# Patient Record
Sex: FEMALE
[De-identification: names, ages, dates, MRNs, and addresses within clinical notes are randomized per-mention and may not be internally consistent; named-entity substitution may affect disease eponyms.]

---

## 2018-01-08 ENCOUNTER — RESULT REVIEW (OUTPATIENT)
Age: 28
End: 2018-01-08

## 2018-12-15 PROBLEM — Z00.00 ENCOUNTER FOR PREVENTIVE HEALTH EXAMINATION: Status: ACTIVE | Noted: 2018-12-15

## 2018-12-24 ENCOUNTER — RECORD ABSTRACTING (OUTPATIENT)
Age: 28
End: 2018-12-24

## 2018-12-24 DIAGNOSIS — Z87.42 PERSONAL HISTORY OF OTHER DISEASES OF THE FEMALE GENITAL TRACT: ICD-10-CM

## 2018-12-24 DIAGNOSIS — Z92.89 PERSONAL HISTORY OF OTHER MEDICAL TREATMENT: ICD-10-CM

## 2018-12-24 DIAGNOSIS — Z78.9 OTHER SPECIFIED HEALTH STATUS: ICD-10-CM

## 2018-12-24 DIAGNOSIS — Z86.39 PERSONAL HISTORY OF OTHER ENDOCRINE, NUTRITIONAL AND METABOLIC DISEASE: ICD-10-CM

## 2018-12-24 DIAGNOSIS — Z80.3 FAMILY HISTORY OF MALIGNANT NEOPLASM OF BREAST: ICD-10-CM

## 2018-12-24 DIAGNOSIS — Z83.3 FAMILY HISTORY OF DIABETES MELLITUS: ICD-10-CM

## 2018-12-24 LAB — CYTOLOGY CVX/VAG DOC THIN PREP: NORMAL

## 2018-12-24 RX ORDER — DIAPER,BRIEF,INFANT-TODD,DISP
240 (27 FE) EACH MISCELLANEOUS
Refills: 0 | Status: ACTIVE | COMMUNITY

## 2018-12-24 RX ORDER — MULTIVITAMIN
TABLET ORAL
Refills: 0 | Status: ACTIVE | COMMUNITY

## 2019-01-14 ENCOUNTER — RESULT CHARGE (OUTPATIENT)
Age: 29
End: 2019-01-14

## 2019-01-14 ENCOUNTER — APPOINTMENT (OUTPATIENT)
Dept: OBGYN | Facility: CLINIC | Age: 29
End: 2019-01-14
Payer: COMMERCIAL

## 2019-01-14 VITALS
SYSTOLIC BLOOD PRESSURE: 124 MMHG | BODY MASS INDEX: 48.23 KG/M2 | HEIGHT: 65.5 IN | WEIGHT: 293 LBS | DIASTOLIC BLOOD PRESSURE: 72 MMHG

## 2019-01-14 DIAGNOSIS — N91.2 AMENORRHEA, UNSPECIFIED: ICD-10-CM

## 2019-01-14 LAB
BILIRUB UR QL STRIP: NORMAL
CLARITY UR: CLEAR
COLLECTION METHOD: NORMAL
GLUCOSE UR-MCNC: NORMAL
HCG UR QL: 0.2 EU/DL
HCG UR QL: NEGATIVE
HGB UR QL STRIP.AUTO: ABNORMAL
KETONES UR-MCNC: NORMAL
LEUKOCYTE ESTERASE UR QL STRIP: NORMAL
NITRITE UR QL STRIP: NORMAL
PH UR STRIP: 6.5
PROT UR STRIP-MCNC: NORMAL
QUALITY CONTROL: YES
SP GR UR STRIP: 1.03

## 2019-01-14 PROCEDURE — 81025 URINE PREGNANCY TEST: CPT

## 2019-01-14 PROCEDURE — 81003 URINALYSIS AUTO W/O SCOPE: CPT | Mod: NC,QW

## 2019-01-14 PROCEDURE — 99395 PREV VISIT EST AGE 18-39: CPT

## 2019-01-14 NOTE — PROCEDURE
[Cervical Pap Smear] : cervical Pap smear [Liquid Base] : liquid base [GC & Chlamydia via Pap] : GC & Chlamydia via Pap

## 2019-01-14 NOTE — HISTORY OF PRESENT ILLNESS
[Last Pap Smear ___] : last Papanicolaou cytology done [unfilled] [Last Mammogram ___] : last mammogram done [unfilled] [Sexually Active] : is sexually active [Monogamous] : is monogamous [Contraception] : uses contraception [Male ___] : [unfilled] male [Definite:  ___ (Date)] : the last menstrual period was [unfilled] [Condoms] : uses condoms [de-identified] : Breast U/S-12/28/2016 B1

## 2019-01-14 NOTE — COUNSELING
[Breast Self Exam] : breast self exam [Nutrition] : nutrition [Exercise] : exercise [Contraception] : contraception

## 2019-01-14 NOTE — DISCUSSION/SUMMARY
[FreeTextEntry1] : we discussed contraception following the bariatric surgery. she is interested in restarting ocps.\par \par she will start provera today, ensure she has a withdrawl bleed. She will then start ocps after her surgery when cleared to resume her normal medications.\par \par f/u prn.\par \par

## 2019-01-14 NOTE — PHYSICAL EXAM
[Alert] : alert [Soft] : soft [Obese] : obese [Oriented x3] : oriented to person, place, and time [Normal] : uterus [Labia Majora] : labia major [Uterine Adnexae] : were not tender and not enlarged [Awake] : not awake [Mass] : no breast mass [Nipple Discharge] : no nipple discharge [Tender] : non tender [Distended] : not distended [Motion Tenderness] : there was no cervical motion tenderness [Tenderness] : nontender [Enlarged ___ wks] : not enlarged [Mass ___ cm] : no uterine mass was palpated [Adnexa Tenderness] : were not tender [Ovarian Mass (___ Cm)] : there were no adnexal masses [FreeTextEntry7] : exam is limited by habitus

## 2019-01-15 LAB
C TRACH RRNA SPEC QL NAA+PROBE: NOT DETECTED
HPV HIGH+LOW RISK DNA PNL CVX: NOT DETECTED
N GONORRHOEA RRNA SPEC QL NAA+PROBE: NOT DETECTED
SOURCE AMPLIFICATION: NORMAL
SOURCE TP AMPLIFICATION: NORMAL
T VAGINALIS RRNA SPEC QL NAA+PROBE: NOT DETECTED

## 2019-01-17 ENCOUNTER — RX RENEWAL (OUTPATIENT)
Age: 29
End: 2019-01-17

## 2019-01-21 LAB — CYTOLOGY CVX/VAG DOC THIN PREP: NORMAL

## 2019-08-01 DIAGNOSIS — Z98.84 BARIATRIC SURGERY STATUS: ICD-10-CM

## 2019-08-01 DIAGNOSIS — Z86.718 PERSONAL HISTORY OF OTHER VENOUS THROMBOSIS AND EMBOLISM: ICD-10-CM

## 2019-08-16 ENCOUNTER — APPOINTMENT (OUTPATIENT)
Dept: OBGYN | Facility: CLINIC | Age: 29
End: 2019-08-16
Payer: COMMERCIAL

## 2019-08-16 ENCOUNTER — ASOB RESULT (OUTPATIENT)
Age: 29
End: 2019-08-16

## 2019-08-16 VITALS
SYSTOLIC BLOOD PRESSURE: 118 MMHG | BODY MASS INDEX: 47.48 KG/M2 | WEIGHT: 285 LBS | HEIGHT: 65 IN | DIASTOLIC BLOOD PRESSURE: 68 MMHG

## 2019-08-16 LAB
BILIRUB UR QL STRIP: NORMAL
COLLECTION METHOD: NORMAL
GLUCOSE UR-MCNC: NORMAL
HCG UR QL: 0.2 EU/DL
HCG UR QL: NEGATIVE
HGB UR QL STRIP.AUTO: ABNORMAL
KETONES UR-MCNC: ABNORMAL
LEUKOCYTE ESTERASE UR QL STRIP: NORMAL
NITRITE UR QL STRIP: NORMAL
PH UR STRIP: 5
PROT UR STRIP-MCNC: NORMAL
QUALITY CONTROL: YES
SP GR UR STRIP: 1.02

## 2019-08-16 PROCEDURE — XXXXX: CPT

## 2019-08-16 PROCEDURE — 76830 TRANSVAGINAL US NON-OB: CPT

## 2019-08-16 NOTE — HISTORY OF PRESENT ILLNESS
[Good] : being in good health [Last Pap ___] : Last cervical pap smear was [unfilled] [Reproductive Age] : is of reproductive age [Last Screen ___] : last STD screen was [unfilled] [Definite:  ___ (Date)] : the last menstrual period was [unfilled] [Menarche Age: ____] : age at menarche was [unfilled] [Sexually Active] : is sexually active [Contraception] : uses contraception [Oral Contraceptives] : uses oral contraceptives [Male ___] : [unfilled] male

## 2019-09-12 ENCOUNTER — APPOINTMENT (OUTPATIENT)
Dept: OBGYN | Facility: CLINIC | Age: 29
End: 2019-09-12
Payer: COMMERCIAL

## 2019-09-12 ENCOUNTER — ASOB RESULT (OUTPATIENT)
Age: 29
End: 2019-09-12

## 2019-09-12 VITALS
HEIGHT: 65 IN | WEIGHT: 285 LBS | DIASTOLIC BLOOD PRESSURE: 70 MMHG | SYSTOLIC BLOOD PRESSURE: 110 MMHG | BODY MASS INDEX: 47.48 KG/M2

## 2019-09-12 DIAGNOSIS — K64.9 UNSPECIFIED HEMORRHOIDS: ICD-10-CM

## 2019-09-12 LAB
BILIRUB UR QL STRIP: NORMAL
GLUCOSE UR-MCNC: NORMAL
HCG UR QL: 0.2 EU/DL
HCG UR QL: NEGATIVE
HGB UR QL STRIP.AUTO: ABNORMAL
KETONES UR-MCNC: NORMAL
LEUKOCYTE ESTERASE UR QL STRIP: ABNORMAL
NITRITE UR QL STRIP: NORMAL
PH UR STRIP: 6
PROT UR STRIP-MCNC: NORMAL
QUALITY CONTROL: YES
SP GR UR STRIP: 1.03

## 2019-09-12 PROCEDURE — 81025 URINE PREGNANCY TEST: CPT

## 2019-09-12 PROCEDURE — 76830 TRANSVAGINAL US NON-OB: CPT

## 2019-09-12 PROCEDURE — 81003 URINALYSIS AUTO W/O SCOPE: CPT | Mod: NC,QW

## 2019-09-12 PROCEDURE — 58301 REMOVE INTRAUTERINE DEVICE: CPT

## 2019-09-12 NOTE — HISTORY OF PRESENT ILLNESS
[___ Month(s) Ago] : [unfilled] month(s) ago [Good] : being in good health [Last Pap ___] : Last cervical pap smear was [unfilled] [Menarche Age: ____] : age at menarche was [unfilled] [Definite:  ___ (Date)] : the last menstrual period was [unfilled] [Contraception] : uses contraception [Sexually Active] : is sexually active [IUD] : has an intrauterine device [Last Mammogram ___] : Last Mammogram was [unfilled]

## 2019-09-19 ENCOUNTER — APPOINTMENT (OUTPATIENT)
Dept: OBGYN | Facility: CLINIC | Age: 29
End: 2019-09-19
Payer: COMMERCIAL

## 2019-09-19 VITALS
BODY MASS INDEX: 46.82 KG/M2 | DIASTOLIC BLOOD PRESSURE: 66 MMHG | SYSTOLIC BLOOD PRESSURE: 110 MMHG | WEIGHT: 281 LBS | HEIGHT: 65 IN

## 2019-09-19 DIAGNOSIS — T83.32XA DISPLACEMENT OF INTRAUTERINE CONTRACEPTIVE DEVICE, INITIAL ENCOUNTER: ICD-10-CM

## 2019-09-19 DIAGNOSIS — Z30.9 ENCOUNTER FOR CONTRACEPTIVE MANAGEMENT, UNSPECIFIED: ICD-10-CM

## 2019-09-19 LAB
HCG UR QL: NEGATIVE
QUALITY CONTROL: YES

## 2019-09-19 PROCEDURE — 81025 URINE PREGNANCY TEST: CPT

## 2019-09-19 PROCEDURE — 58300 INSERT INTRAUTERINE DEVICE: CPT

## 2019-09-19 PROCEDURE — 99212 OFFICE O/P EST SF 10 MIN: CPT | Mod: 25

## 2019-09-19 PROCEDURE — 76830 TRANSVAGINAL US NON-OB: CPT

## 2019-09-19 RX ORDER — LEVONORGESTREL 19.5 MG/1
19.5 INTRAUTERINE DEVICE INTRAUTERINE
Refills: 0 | Status: COMPLETED | OUTPATIENT
Start: 2019-09-19

## 2019-09-19 RX ADMIN — LEVONORGESTREL 0 MG: 19.5 INTRAUTERINE DEVICE INTRAUTERINE at 00:00

## 2019-09-19 NOTE — PROCEDURE
[IUD Placement] : intrauterine device (IUD) placement [Prevention of Pregnancy] : prevention of pregnancy [Risks] : risks [Benefits] : benefits [Alternatives] : alternatives [Patient] : patient [CONSENT OBTAINED] : written consent was obtained prior to the procedure. [LMP ___] : LMP was [unfilled] [Neg Pregnancy Test] : a pregnancy test was negative [Neg GC/Chlamydia] : a a serum GC/Chlamydia was negative [ParaGard IUD] : The ParaGard IUD was inserted to the fundus of the uterus.  The IUD strings were cut to an appropriate length. [Lot Number: ___] : IUD lot number: [unfilled] [Infection] : infection [Bleeding] : bleeding [Pain] : pain [Expulsion] : expulsion [Failure] : failure [Uterine Perforation] : uterine perforation [No Premedication] : No premedication [Tenaculum] : a single toothed tenaculum [Required Dilation] : required dilation [Post Placement Transvag. US] : post placement transvaginal ultrasound completed [Tolerated Well] : the patient tolerated the procedure well [No Complications] : there were no complications [None] : no post-procedure medications given

## 2019-09-19 NOTE — PHYSICAL EXAM
[Labia Majora] : labia major [Labia Minora] : labia minora [Normal] : clitoris [Scant] : there was scant vaginal bleeding [Old Blood] : old blood was present in the vagina

## 2019-09-19 NOTE — REVIEW OF SYSTEMS
[Recent Wt Gain ___ Lbs] : recent [unfilled] ~Ulb weight gain [Sight Problems] : sight problems [Vomiting] : vomiting [Headache] : headache [Nl] : Hematologic/Lymphatic [FreeTextEntry2] : NAUSEA

## 2019-09-19 NOTE — HISTORY OF PRESENT ILLNESS
[Definite:  ___ (Date)] : the last menstrual period was [unfilled] [Menarche Age: ____] : age at menarche was [unfilled] [de-identified] : 2 weeks

## 2019-09-19 NOTE — PHYSICAL EXAM
[Awake] : awake [Alert] : alert [Acute Distress] : no acute distress [No Lesions] : no genitalia lesions [Labia Majora Erythema] : no erythema of the labia majora [Labia Minora Erythema] : no erythema of the labia minora [Normal] : cervix [Labia Majora] : labia major [Labia Minora] : labia minora [Pink Rugae] : pink rugae [No Bleeding] : there was no active vaginal bleeding [IUD String] : had an IUD string protruding out [External Hemorrhoid] : an external hemorrhoid [FreeTextEntry5] : IUD string removed using ring forceps on first attempt due to IUD malposition.

## 2019-09-20 LAB
C TRACH RRNA SPEC QL NAA+PROBE: NOT DETECTED
N GONORRHOEA RRNA SPEC QL NAA+PROBE: NOT DETECTED
SOURCE AMPLIFICATION: NORMAL

## 2019-10-31 ENCOUNTER — APPOINTMENT (OUTPATIENT)
Dept: OBGYN | Facility: CLINIC | Age: 29
End: 2019-10-31
Payer: COMMERCIAL

## 2019-10-31 VITALS
SYSTOLIC BLOOD PRESSURE: 102 MMHG | HEIGHT: 65 IN | DIASTOLIC BLOOD PRESSURE: 78 MMHG | WEIGHT: 281 LBS | BODY MASS INDEX: 46.82 KG/M2

## 2019-10-31 PROCEDURE — 99213 OFFICE O/P EST LOW 20 MIN: CPT | Mod: 25

## 2019-10-31 PROCEDURE — 76830 TRANSVAGINAL US NON-OB: CPT

## 2019-10-31 NOTE — HISTORY OF PRESENT ILLNESS
[___ Month(s) Ago] : [unfilled] month(s) ago [Good] : being in good health [Healthy Diet] : a healthy diet [Regular Exercise] : regular exercise [Last Mammogram ___] : Last Mammogram was [unfilled] [Last Pap ___] : Last cervical pap smear was [unfilled] [Reproductive Age] : is of reproductive age [Sexually Active] : is sexually active [Contraception] : uses contraception [IUD] : has an intrauterine device [Definite:  ___ (Date)] : the last menstrual period was [unfilled] [Pregnancy History] : denies prior pregnancies

## 2019-10-31 NOTE — PROCEDURE
[Locate IUD] : locate IUD [Transvaginal Ultrasound] : transvaginal ultrasound [Present] : uterus present [L: ___ cm] : L: [unfilled] cm [W: ___cm] : W: [unfilled] cm [H: ___ cm] : H: [unfilled] cm [FreeTextEntry5] : 0.8cm endometrium with IUD in situ [FreeTextEntry6] : b/l ovaries wnl [FreeTextEntry7] : 2.8x2.3x2.2cm [FreeTextEntry8] : 3.1x1.7x1.9cm

## 2020-02-25 ENCOUNTER — APPOINTMENT (OUTPATIENT)
Dept: OBGYN | Facility: CLINIC | Age: 30
End: 2020-02-25
Payer: COMMERCIAL

## 2020-02-25 VITALS
WEIGHT: 259 LBS | BODY MASS INDEX: 43.15 KG/M2 | SYSTOLIC BLOOD PRESSURE: 116 MMHG | HEIGHT: 65 IN | DIASTOLIC BLOOD PRESSURE: 84 MMHG

## 2020-02-25 DIAGNOSIS — F31.70 BIPOLAR DISORDER, CURRENTLY IN REMISSION, MOST RECENT EPISODE UNSPECIFIED: ICD-10-CM

## 2020-02-25 PROCEDURE — 81003 URINALYSIS AUTO W/O SCOPE: CPT | Mod: NC,QW

## 2020-02-25 PROCEDURE — 99395 PREV VISIT EST AGE 18-39: CPT

## 2020-02-25 NOTE — HISTORY OF PRESENT ILLNESS
[1 Year Ago] : 1 year ago [Last Pap ___] : Last cervical pap smear was [unfilled] [Definite:  ___ (Date)] : the last menstrual period was [unfilled] [Sexually Active] : is sexually active [Menarche Age: ____] : age at menarche was [unfilled] [Contraception] : uses contraception [IUD] : has an intrauterine device [Male ___] : [unfilled] male [Good] : being in good health

## 2020-02-25 NOTE — REVIEW OF SYSTEMS
[Sight Problems] : sight problems [Vomiting] : vomiting [Headache] : headache [Depression] : depression [Nl] : Musculoskeletal [Recent ___ Lb Weight Loss] : recent [unfilled] ~Ulb weight loss

## 2020-02-26 LAB
BILIRUB UR QL STRIP: ABNORMAL
GLUCOSE UR-MCNC: NORMAL
HCG UR QL: 0.2 EU/DL
HGB UR QL STRIP.AUTO: NORMAL
KETONES UR-MCNC: NORMAL
LEUKOCYTE ESTERASE UR QL STRIP: NORMAL
NITRITE UR QL STRIP: NORMAL
PH UR STRIP: 6
PROT UR STRIP-MCNC: NORMAL
SP GR UR STRIP: >1.03

## 2020-02-28 LAB — CYTOLOGY CVX/VAG DOC THIN PREP: NORMAL

## 2020-03-13 NOTE — PHYSICAL EXAM
[Alert] : alert [Awake] : awake [Soft] : soft [Oriented x3] : oriented to person, place, and time [Normal] : uterus [Uterine Adnexae] : were not tender and not enlarged [Mass] : no breast mass [Nipple Discharge] : no nipple discharge [Axillary LAD] : no axillary lymphadenopathy [Tender] : non tender [Distended] : not distended [IUD String] : had an IUD string protruding out [Motion Tenderness] : there was no cervical motion tenderness [Tenderness] : nontender [Enlarged ___ wks] : not enlarged [Mass ___ cm] : no uterine mass was palpated

## 2020-03-13 NOTE — DISCUSSION/SUMMARY
[FreeTextEntry1] : 1. f/u pap results\par 2. very  happy with iud- continue\par 3. encouraged continued regular exercise, healthy diet

## 2021-03-03 ENCOUNTER — APPOINTMENT (OUTPATIENT)
Dept: OBGYN | Facility: CLINIC | Age: 31
End: 2021-03-03

## 2021-03-04 ENCOUNTER — APPOINTMENT (OUTPATIENT)
Dept: OBGYN | Facility: CLINIC | Age: 31
End: 2021-03-04
Payer: COMMERCIAL

## 2021-03-04 ENCOUNTER — LABORATORY RESULT (OUTPATIENT)
Age: 31
End: 2021-03-04

## 2021-03-04 VITALS
HEIGHT: 65 IN | BODY MASS INDEX: 42.99 KG/M2 | SYSTOLIC BLOOD PRESSURE: 120 MMHG | WEIGHT: 258 LBS | TEMPERATURE: 97.3 F | DIASTOLIC BLOOD PRESSURE: 70 MMHG

## 2021-03-04 DIAGNOSIS — Z97.5 PRESENCE OF (INTRAUTERINE) CONTRACEPTIVE DEVICE: ICD-10-CM

## 2021-03-04 PROCEDURE — 99395 PREV VISIT EST AGE 18-39: CPT

## 2021-03-04 PROCEDURE — 99072 ADDL SUPL MATRL&STAF TM PHE: CPT

## 2021-03-04 NOTE — PHYSICAL EXAM
[Appropriately responsive] : appropriately responsive [Alert] : alert [No Acute Distress] : no acute distress [Soft] : soft [Non-tender] : non-tender [Non-distended] : non-distended [No Lesions] : no lesions [No Mass] : no mass [Oriented x3] : oriented x3 [Examination Of The Breasts] : a normal appearance [No Masses] : no breast masses were palpable [Labia Majora] : normal [Labia Minora] : normal [Normal] : normal [Uterine Adnexae] : normal [FreeTextEntry5] : IUD string not seen

## 2021-03-04 NOTE — HISTORY OF PRESENT ILLNESS
[Menarche Age: ____] : age at menarche was [unfilled] [Currently Active] : currently active [Men] : men [Vaginal] : vaginal [No] : No [Yes] : Yes [Patient would like to be screened for STIs] : Patient would like to be screened for STIs [TextBox_4] : Felisa is here for an annual exam.\par \par She is doing well. She is happy with her IUD however her boyfriend felt it poking him during sex last week. She states she was in a crouching position when he felt it.  She denies any issues with the IUD.  She denies seeing the IUD expel. [Mammogramdate] : 12/28/16 [BreastSonogramDate] : 12/28/16 [PapSmeardate] : 2/25/20 [TextBox_31] : negative [TextBox_29] : n/a [TextBox_36] : n/a [TextBox_6] : 2/12/21 [TextBox_9] : 19 [TextBox_28] : n/a [TextBox_34] : n/a [FreeTextEntry1] : partner felt IUD poking him [FreeTextEntry3] : iud

## 2021-03-04 NOTE — PLAN
[FreeTextEntry1] : - recommend pelvic US to assess IUD position due to non visualization of strings today on exam and c/o partner feeling something poke him\par - SBE encouraged\par - continue regular exercise, healthy diet- she is doing so well!

## 2021-03-05 LAB
C TRACH RRNA SPEC QL NAA+PROBE: NOT DETECTED
N GONORRHOEA RRNA SPEC QL NAA+PROBE: NOT DETECTED
SOURCE TP AMPLIFICATION: NORMAL

## 2021-03-08 ENCOUNTER — ASOB RESULT (OUTPATIENT)
Age: 31
End: 2021-03-08

## 2021-03-08 ENCOUNTER — APPOINTMENT (OUTPATIENT)
Dept: OBGYN | Facility: CLINIC | Age: 31
End: 2021-03-08
Payer: COMMERCIAL

## 2021-03-08 PROCEDURE — 99072 ADDL SUPL MATRL&STAF TM PHE: CPT

## 2021-03-08 PROCEDURE — 76830 TRANSVAGINAL US NON-OB: CPT

## 2021-03-18 ENCOUNTER — NON-APPOINTMENT (OUTPATIENT)
Age: 31
End: 2021-03-18

## 2021-03-18 LAB
CYTOLOGY CVX/VAG DOC THIN PREP: ABNORMAL
HPV HIGH+LOW RISK DNA PNL CVX: DETECTED

## 2021-03-31 ENCOUNTER — RESULT CHARGE (OUTPATIENT)
Age: 31
End: 2021-03-31

## 2021-03-31 ENCOUNTER — APPOINTMENT (OUTPATIENT)
Dept: OBGYN | Facility: CLINIC | Age: 31
End: 2021-03-31
Payer: COMMERCIAL

## 2021-03-31 VITALS
WEIGHT: 258 LBS | SYSTOLIC BLOOD PRESSURE: 116 MMHG | HEIGHT: 65 IN | BODY MASS INDEX: 42.99 KG/M2 | TEMPERATURE: 97 F | DIASTOLIC BLOOD PRESSURE: 78 MMHG

## 2021-03-31 DIAGNOSIS — R87.810 ATYPICAL SQUAMOUS CELLS OF UNDETERMINED SIGNIFICANCE ON CYTOLOGIC SMEAR OF CERVIX (ASC-US): ICD-10-CM

## 2021-03-31 DIAGNOSIS — R87.610 ATYPICAL SQUAMOUS CELLS OF UNDETERMINED SIGNIFICANCE ON CYTOLOGIC SMEAR OF CERVIX (ASC-US): ICD-10-CM

## 2021-03-31 LAB
HCG UR QL: NEGATIVE
QUALITY CONTROL: YES

## 2021-03-31 PROCEDURE — 99072 ADDL SUPL MATRL&STAF TM PHE: CPT

## 2021-03-31 PROCEDURE — 81025 URINE PREGNANCY TEST: CPT

## 2021-03-31 PROCEDURE — 57454 BX/CURETT OF CERVIX W/SCOPE: CPT

## 2021-03-31 NOTE — PLAN
[FreeTextEntry1] : - Patient presents for colposcopy due to pap: ASCUS, HPV HR Positive (16/18/45: Negative). Procedure details, r/b/a were discussed. Consent was signed. UCG today: Negative. Please see procedure details above.\par \par - Pain control PRN with OTC ibuprofen. Pelvic rest x 2 weeks discussed. Post procedure expectations and call parameters were reviewed. \par \par - Follow up in 2 weeks.

## 2021-03-31 NOTE — PROCEDURE
[Colposcopy] : Colposcopy  [Consent Obtained] : Consent obtained [ASCUS] : ASCUS [HPV High Risk] : HPV high risk [Risks] : risks [Benefits] : benefits [Patient] : patient [Infection] : infection [Bleeding] : bleeding [Allergic Reaction] : allergic reaction [Time out performed] : Pre-procedure time out performed.  Patient's name, date of birth and procedure confirmed. [No Premedication] : no premedication [Colposcopy Adequate] : colposcopy adequate [ECC Performed] : ECC performed [No Abnormalities] : no abnormalities [Lesion] : lesion seen [Biopsy] : biopsy taken [Hemostasis Obtained] : Hemostasis obtained [Tolerated Well] : the patient tolerated the procedure well [de-identified] : 2 [de-identified] : Acetowhite area at 6 o'clock after application of Acetic Acid. No non-staining areas after application with Lugol's solution. [de-identified] : 6 o'clock of cervix, Endocervical curetting [de-identified] : 6 o'clock of cervix, Endocervical curetting [de-identified] : Excellent hemostasis obtained with Monsel's solution.

## 2021-03-31 NOTE — ASSESSMENT
[FreeTextEntry1] : Ms Nelson presents for colposcopy. She has her annual GYN exam on 3/4/21 and her pap was significant for ASCUS, HPV HR positive. \par \par She is doing well.\par

## 2021-03-31 NOTE — END OF VISIT
[FreeTextEntry3] : I, Valentín Callahan, acted solely as a scribe for Dr. Woody on this date 03/31/2021.\par All medical record entries made by the Scribe were at my, Dr. Woody's direction and personally dictated by me on  03/31/2021. I have reviewed the chart and agree that the record accurately reflects my personal performance of the history, physical exam, assessment and plan. I have also personally directed, reviewed, and agreed with the chart.\par

## 2021-04-19 ENCOUNTER — APPOINTMENT (OUTPATIENT)
Dept: OBGYN | Facility: CLINIC | Age: 31
End: 2021-04-19
Payer: COMMERCIAL

## 2021-04-19 LAB — CORE LAB BIOPSY: NORMAL

## 2021-04-19 PROCEDURE — XXXXX: CPT

## 2021-07-15 ENCOUNTER — ASOB RESULT (OUTPATIENT)
Age: 31
End: 2021-07-15

## 2021-07-15 ENCOUNTER — APPOINTMENT (OUTPATIENT)
Dept: OBGYN | Facility: CLINIC | Age: 31
End: 2021-07-15
Payer: COMMERCIAL

## 2021-07-15 DIAGNOSIS — N83.209 UNSPECIFIED OVARIAN CYST, UNSPECIFIED SIDE: ICD-10-CM

## 2021-07-15 PROCEDURE — 99072 ADDL SUPL MATRL&STAF TM PHE: CPT

## 2021-07-15 PROCEDURE — 76830 TRANSVAGINAL US NON-OB: CPT

## 2022-06-21 ENCOUNTER — APPOINTMENT (OUTPATIENT)
Dept: OBGYN | Facility: CLINIC | Age: 32
End: 2022-06-21

## 2022-07-09 ENCOUNTER — APPOINTMENT (OUTPATIENT)
Dept: OBGYN | Facility: CLINIC | Age: 32
End: 2022-07-09

## 2022-07-09 ENCOUNTER — NON-APPOINTMENT (OUTPATIENT)
Age: 32
End: 2022-07-09

## 2022-07-09 ENCOUNTER — LABORATORY RESULT (OUTPATIENT)
Age: 32
End: 2022-07-09

## 2022-07-09 VITALS
DIASTOLIC BLOOD PRESSURE: 80 MMHG | BODY MASS INDEX: 48.82 KG/M2 | WEIGHT: 293 LBS | HEIGHT: 65 IN | SYSTOLIC BLOOD PRESSURE: 120 MMHG

## 2022-07-09 DIAGNOSIS — Z30.432 ENCOUNTER FOR REMOVAL OF INTRAUTERINE CONTRACEPTIVE DEVICE: ICD-10-CM

## 2022-07-09 DIAGNOSIS — Z01.419 ENCOUNTER FOR GYNECOLOGICAL EXAMINATION (GENERAL) (ROUTINE) W/OUT ABNORMAL FINDINGS: ICD-10-CM

## 2022-07-09 DIAGNOSIS — Z31.69 ENCOUNTER FOR OTHER GENERAL COUNSELING AND ADVICE ON PROCREATION: ICD-10-CM

## 2022-07-09 DIAGNOSIS — Z11.3 ENCOUNTER FOR SCREENING FOR INFECTIONS WITH A PREDOMINANTLY SEXUAL MODE OF TRANSMISSION: ICD-10-CM

## 2022-07-09 DIAGNOSIS — Z12.4 ENCOUNTER FOR SCREENING FOR MALIGNANT NEOPLASM OF CERVIX: ICD-10-CM

## 2022-07-09 LAB
HCG UR QL: NEGATIVE
QUALITY CONTROL: YES

## 2022-07-09 PROCEDURE — 99395 PREV VISIT EST AGE 18-39: CPT | Mod: 25

## 2022-07-09 PROCEDURE — 58301 REMOVE INTRAUTERINE DEVICE: CPT

## 2022-07-09 PROCEDURE — 81025 URINE PREGNANCY TEST: CPT

## 2022-07-09 RX ORDER — ARIPIPRAZOLE 15 MG/1
15 TABLET ORAL
Qty: 15 | Refills: 0 | Status: ACTIVE | COMMUNITY
Start: 2022-04-21

## 2022-07-09 RX ORDER — BUTALBITAL, ACETAMINOPHEN AND CAFFEINE 50; 325; 40 MG/1; MG/1; MG/1
50-325-40 CAPSULE ORAL
Qty: 15 | Refills: 0 | Status: ACTIVE | COMMUNITY
Start: 2021-10-20

## 2022-07-09 RX ORDER — HYDROCORTISONE 25 MG/G
2.5 CREAM TOPICAL TWICE DAILY
Qty: 1 | Refills: 0 | Status: DISCONTINUED | COMMUNITY
Start: 2019-09-12 | End: 2022-07-09

## 2022-07-09 NOTE — PHYSICAL EXAM
[Appropriately responsive] : appropriately responsive [Alert] : alert [No Acute Distress] : no acute distress [Soft] : soft [Non-tender] : non-tender [Non-distended] : non-distended [No Lesions] : no lesions [No Mass] : no mass [Oriented x3] : oriented x3 [Examination Of The Breasts] : a normal appearance [No Masses] : no breast masses were palpable [Tenderness] : nontender [Normal] : normal [Enlarged ___ wks] : not enlarged [Mass ___ cm] : no uterine mass was palpated [Uterine Adnexae] : normal

## 2022-07-09 NOTE — PROCEDURE
[Cervical Pap Smear] : cervical Pap smear [Liquid Base] : liquid base [GC & Chlamydia via Pap] : GC & Chlamydia via Pap [IUD Removal] : intrauterine device (IUD) removal [Fertility Desired] : fertility desired [Risks] : risks [Benefits] : benefits [Alternatives] : alternatives [Patient] : patient [IUD Discarded] : IUD discarded [Tolerated Well] : Patient tolerated the procedure well [No Complications] : no complications

## 2022-07-09 NOTE — HISTORY OF PRESENT ILLNESS
[IUD] : has an intrauterine device [Y] : Patient is sexually active [N] : Patient denies prior pregnancies [Menarche Age: ____] : age at menarche was [unfilled] [Currently Active] : currently active [Women] : women [No] : No [Patient would like to be screened for STIs] : Patient would like to be screened for STIs [TextBox_4] : Felisa is here for her annual exam. she also wants her IUD removed. she and her partner (female) are considering pregnancy in the next year.  She would be carrying the pregnancy.  Her partner is in the army.\par \par  [PapSmeardate] : 03/04/21 [TextBox_31] : ASCUS  [HPVDate] : 03/04/21 [LMPDate] : 07/06/22 [TextBox_78] : POS  [PGHxTotal] : 0 [FreeTextEntry1] : 07/06/22

## 2022-07-09 NOTE — PLAN
[FreeTextEntry1] : - referral to reproductive endocrinology for fertility options\par - iud removed uneventfully, post removal instructions reviewed\par - pap obtained- f/u results- if abnormal will need another colpo\par - self breast exam encouraged

## 2022-07-15 DIAGNOSIS — N76.0 ACUTE VAGINITIS: ICD-10-CM

## 2022-07-15 LAB
CYTOLOGY CVX/VAG DOC THIN PREP: ABNORMAL
HPV HIGH+LOW RISK DNA PNL CVX: DETECTED

## 2022-07-15 RX ORDER — METRONIDAZOLE 7.5 MG/G
0.75 GEL VAGINAL
Qty: 1 | Refills: 0 | Status: ACTIVE | COMMUNITY
Start: 2022-07-15

## 2022-09-06 ENCOUNTER — APPOINTMENT (OUTPATIENT)
Dept: OBGYN | Facility: CLINIC | Age: 32
End: 2022-09-06

## 2022-09-06 VITALS
WEIGHT: 293 LBS | DIASTOLIC BLOOD PRESSURE: 68 MMHG | SYSTOLIC BLOOD PRESSURE: 114 MMHG | BODY MASS INDEX: 48.82 KG/M2 | HEIGHT: 65 IN

## 2022-09-06 DIAGNOSIS — B97.7 PAPILLOMAVIRUS AS THE CAUSE OF DISEASES CLASSIFIED ELSEWHERE: ICD-10-CM

## 2022-09-06 LAB
HCG UR QL: NEGATIVE
QUALITY CONTROL: YES

## 2022-09-06 PROCEDURE — 90649 4VHPV VACCINE 3 DOSE IM: CPT

## 2022-09-06 PROCEDURE — 81025 URINE PREGNANCY TEST: CPT

## 2022-09-06 PROCEDURE — 57454 BX/CURETT OF CERVIX W/SCOPE: CPT

## 2022-09-06 PROCEDURE — 90471 IMMUNIZATION ADMIN: CPT

## 2022-09-10 NOTE — PROCEDURE
[Colposcopy] : Colposcopy  [Time out performed] : Pre-procedure time out performed.  Patient's name, date of birth and procedure confirmed. [Consent Obtained] : Consent obtained [Risks] : risks [Benefits] : benefits [Alternatives] : alternatives [Patient] : patient [Infection] : infection [Bleeding] : bleeding [Allergic Reaction] : allergic reaction [HPV High Risk] : HPV high risk [Ibuprofen ___mg] : Ibuprofen ~Vmg [Colposcopy Adequate] : colposcopy adequate [Pap Performed] : pap not performed [SCI Fully Visualized] : SCI fully visualized [ECC Performed] : ECC performed [No Abnormalities] : no abnormalities [Biopsy] : biopsy taken [Hemostasis Obtained] : Hemostasis obtained [Tolerated Well] : the patient tolerated the procedure well [de-identified] : 1 [de-identified] : ECC and cytobrush [de-identified] : EBL <5 cc

## 2022-09-10 NOTE — ASSESSMENT
[FreeTextEntry1] : 31 y/o g0\par \par Pap Smears: \par 7/2022 neg pap, +HRHPV, neg 16/18\par 3/2021 ASCUS, +HRHPV, neg 16/18\par \par Colposcopy:\par 7/2021 neg ECC, neg biopsy\par \par #COLPOSCOPY TODAY\par -ucg neg\par -consent obtained\par -biopsy: ECC; no abnormalities seen\par -denies tobacco use\par -recommend Vitamin C and MV\par \par #gardasil #1 \par -given today after consent in Deltoid. LOT [id number]. Exp 9/15/24\par \par #hx of anemia\par -getting IV iron transfusion s/ [place] [facility]\par \par OF NOTE patient is getting  to her female partner 4/2023 and Plans to attempt pregnancy December 2023.\par \par rto 2 mos gardasil #2

## 2022-09-11 LAB — CORE LAB BIOPSY: NORMAL

## 2022-09-12 ENCOUNTER — NON-APPOINTMENT (OUTPATIENT)
Age: 32
End: 2022-09-12

## 2022-11-16 ENCOUNTER — APPOINTMENT (OUTPATIENT)
Dept: OBGYN | Facility: CLINIC | Age: 32
End: 2022-11-16

## 2022-11-16 VITALS
SYSTOLIC BLOOD PRESSURE: 114 MMHG | TEMPERATURE: 97.1 F | HEIGHT: 65 IN | WEIGHT: 293 LBS | DIASTOLIC BLOOD PRESSURE: 78 MMHG | BODY MASS INDEX: 48.82 KG/M2

## 2022-11-16 DIAGNOSIS — Z23 ENCOUNTER FOR IMMUNIZATION: ICD-10-CM

## 2022-11-16 PROCEDURE — 90471 IMMUNIZATION ADMIN: CPT

## 2022-11-16 PROCEDURE — 90651 9VHPV VACCINE 2/3 DOSE IM: CPT

## 2022-11-16 NOTE — HISTORY OF PRESENT ILLNESS
[Y] : Patient is sexually active [N] : Patient denies prior pregnancies [Menarche Age: ____] : age at menarche was [unfilled] [No] : Patient does not have concerns regarding sex [Currently Active] : currently active [Papeardate] : 07/09/22 [TextBox_31] : WNL [GonorrheaDate] : 07/09/22 [TextBox_63] : NEG [ChlamydiaDate] : 07/09/22 [TextBox_68] : NEG [HPVDate] : 07/09/22 [TextBox_78] : POSITIVE [LMPDate] : 11/05/22 [PGHxTotal] : 0 [FreeTextEntry1] : 11/05/22 [Women] : women

## 2022-11-16 NOTE — DISCUSSION/SUMMARY
[FreeTextEntry1] : Gardasil 9 second dose administered today in right deltoid. LOT [id number] EXP 9/5/24. Pt tolerated well.\par \par Pt up to date on pap, annual done 7/9/22, no other GYN concerns at this time. Letter provided from GYN standpoint cleared to conceive, pt is moving to a different state next week and encouraged pt to establish care with OBGYN for continued care/management. Pt will need 3rd dose of Gardasil 9 series 6 months from first dose. \par \par

## 2023-11-22 NOTE — REVIEW OF SYSTEMS
Reason for visit: Routine Prenatal Visit      HPI:   22 y.o., at 30w3d by Estimated Date of Delivery: 24    In with no c/0    - Contractions: reports slava Medina  - Bleeding: denies  - Loss of fluid: denies  - Fetal movement: reports good FM, reinforced BID  - Nausea: no  - Vomiting: no  - Headache: no      Reviewed:    Past medical, surgical, social, family, and obstetric history: Reviewed and updated in EMR.  Medications: Reviewed and updated in EMR.  Allergies: Patient has no known allergies.    Pregnancy dating, labs, ultrasound reports, prenatal testing, and problem list: Reviewed and updated in EMR.  Outside records: na  Independent interpretation of tests: na  Discussion with another healthcare professional: na      Vitals: /68   Wt 62.6 kg (138 lb 0.1 oz)   BMI 24.45 kg/m²     Physical exam:  GENERAL: No acute distress  ABD: Gravid      Assessment and Plan:    Encounter for supervision of other normal pregnancy in third trimester    Encounter for supervision of normal first pregnancy in third trimester  -     BREAST PUMP FOR HOME USE  -     US MFM Procedure ([facility])-Future; Future; Expected date: 2023        TDAP admin today  Growth scan ordered  Breast pump rx provided  Pt has decided to use OCPs after delivery- does not want PP IUD     labor precautions given  Follow-up: 2 weeks      I spent a total of 20 minutes on the day of the visit. This includes face to face time and non-face to face time preparing to see the patient (eg, review of tests), Obtaining and/or reviewing separately obtained history, Documenting clinical information in the electronic or other health record, Independently interpreting results and communicating results to the patient/family/caregiver, or Care coordination.       
[Nl] : Integumentary